# Patient Record
Sex: FEMALE | Race: BLACK OR AFRICAN AMERICAN | NOT HISPANIC OR LATINO
[De-identification: names, ages, dates, MRNs, and addresses within clinical notes are randomized per-mention and may not be internally consistent; named-entity substitution may affect disease eponyms.]

---

## 2024-08-27 ENCOUNTER — APPOINTMENT (OUTPATIENT)
Age: 79
End: 2024-08-27

## 2024-08-27 VITALS
OXYGEN SATURATION: 94 % | DIASTOLIC BLOOD PRESSURE: 80 MMHG | BODY MASS INDEX: 28.79 KG/M2 | SYSTOLIC BLOOD PRESSURE: 148 MMHG | WEIGHT: 190 LBS | HEIGHT: 68 IN | RESPIRATION RATE: 14 BRPM | TEMPERATURE: 97.4 F | HEART RATE: 77 BPM

## 2024-08-27 DIAGNOSIS — R06.02 SHORTNESS OF BREATH: ICD-10-CM

## 2024-08-27 DIAGNOSIS — Z87.891 PERSONAL HISTORY OF NICOTINE DEPENDENCE: ICD-10-CM

## 2024-08-27 DIAGNOSIS — J43.2 CENTRILOBULAR EMPHYSEMA: ICD-10-CM

## 2024-08-27 PROCEDURE — 94729 DIFFUSING CAPACITY: CPT

## 2024-08-27 PROCEDURE — 99203 OFFICE O/P NEW LOW 30 MIN: CPT | Mod: 25

## 2024-08-27 PROCEDURE — 94726 PLETHYSMOGRAPHY LUNG VOLUMES: CPT

## 2024-08-27 PROCEDURE — 94060 EVALUATION OF WHEEZING: CPT

## 2024-08-27 RX ORDER — ALBUTEROL SULFATE 90 UG/1
108 (90 BASE) INHALANT RESPIRATORY (INHALATION)
Qty: 1 | Refills: 0 | Status: ACTIVE | COMMUNITY
Start: 2024-08-27 | End: 1900-01-01

## 2024-08-28 PROBLEM — Z87.891 FORMER SMOKER: Status: ACTIVE | Noted: 2024-08-27

## 2024-08-28 PROBLEM — R06.02 SHORTNESS OF BREATH: Status: ACTIVE | Noted: 2024-08-28

## 2024-08-28 PROBLEM — J43.2 CENTRILOBULAR EMPHYSEMA: Status: ACTIVE | Noted: 2024-08-27

## 2024-08-28 NOTE — ASSESSMENT
[FreeTextEntry1] :  78 yo F. with PMH of HTN, CKD, DM who presents to establish care for concern over SOB/Breathlessness. She reports SOB x 2 months, worse at night, has to prop herself on up pillows or sleep next to a window for relief. CT Chest - July 2024 done at [facility] personally reviewed images and report, notable for centrilobular emphysema. TTE - August 2024 - EF 57%, normal PA pressure. On exam today, lungs are clear, no wheezing or rhonchi, in NAD. No LE edema or JVD noted. In office PFT completed - moderately severe obstructive disease and diffusion defect, minimal restriction. Care plans discussed - Emphysema likely contributing to her symptoms and PFT pattern, agreed to trial Trelegy 100mcg daily, Rx given for Albuterol to use as needed. Follow-up in 1 month.

## 2024-08-28 NOTE — ASSESSMENT
[FreeTextEntry1] :  78 yo F. with PMH of HTN, CKD, DM who presents to establish care for concern over SOB/Breathlessness. She reports SOB x 2 months, worse at night, has to prop herself on up pillows or sleep next to a window for relief. CT Chest - July 2024 done at [facility] personally reviewed images and report, notable for centrilobular emphysema. TTE - August 2024 - EF 57%, normal PA pressure. On exam today, lungs are clear, no wheezing or rhonchi, in NAD. No LE edema or JVD noted. In office PFT completed - moderately severe obstructive disease and diffusion defect, minimal restriction. Care plans discussed - Emphysema likely contributing to her symptoms and PFT pattern, agreed to trial Trelegy 100mcg daily, Rx given for Albuterol to use as needed. Follow-up in 1 month.

## 2024-09-04 ENCOUNTER — RX RENEWAL (OUTPATIENT)
Age: 79
End: 2024-09-04

## 2024-09-04 RX ORDER — FLUTICASONE FUROATE, UMECLIDINIUM BROMIDE AND VILANTEROL TRIFENATATE 100; 62.5; 25 UG/1; UG/1; UG/1
100-62.5-25 POWDER RESPIRATORY (INHALATION) DAILY
Qty: 60 | Refills: 0 | Status: ACTIVE | COMMUNITY
Start: 2024-08-27 | End: 1900-01-01

## 2024-09-17 ENCOUNTER — APPOINTMENT (OUTPATIENT)
Age: 79
End: 2024-09-17
Payer: MEDICARE

## 2024-09-17 VITALS
DIASTOLIC BLOOD PRESSURE: 80 MMHG | BODY MASS INDEX: 29.86 KG/M2 | HEIGHT: 68 IN | SYSTOLIC BLOOD PRESSURE: 138 MMHG | HEART RATE: 79 BPM | WEIGHT: 197 LBS | RESPIRATION RATE: 14 BRPM | OXYGEN SATURATION: 96 %

## 2024-09-17 DIAGNOSIS — R06.02 SHORTNESS OF BREATH: ICD-10-CM

## 2024-09-17 DIAGNOSIS — Z87.891 PERSONAL HISTORY OF NICOTINE DEPENDENCE: ICD-10-CM

## 2024-09-17 DIAGNOSIS — J43.2 CENTRILOBULAR EMPHYSEMA: ICD-10-CM

## 2024-09-17 PROCEDURE — 99213 OFFICE O/P EST LOW 20 MIN: CPT

## 2024-09-17 NOTE — ASSESSMENT
[FreeTextEntry1] :  78 yo F. with PMH of HTN, CKD, DM who presents for follow-up of SOB/Breathlessness.  CT Chest - July 2024 - centrilobular emphysema TTE - August 2024 - EF 57%, normal PA pressure Last visit was started on Trelegy, reports improvement in SOB with use.  Is able to sleep at night now without having to be by a window or sleep elevated.  On exam, lungs are clear, no wheezing or rhonchi, in NAD.  Care plans discussed - will continue for Trelegy (100mcg/62.5mcg/25 mcg) 1 puff Daily and Albuterol HFA to use as needed for SOB. Follow-up in 2 months.

## 2024-09-17 NOTE — ASSESSMENT
[FreeTextEntry1] :  80 yo F. with PMH of HTN, CKD, DM who presents for follow-up of SOB/Breathlessness.  CT Chest - July 2024 - centrilobular emphysema TTE - August 2024 - EF 57%, normal PA pressure Last visit was started on Trelegy, reports improvement in SOB with use.  Is able to sleep at night now without having to be by a window or sleep elevated.  On exam, lungs are clear, no wheezing or rhonchi, in NAD.  Care plans discussed - will continue for Trelegy (100mcg/62.5mcg/25 mcg) 1 puff Daily and Albuterol HFA to use as needed for SOB. Follow-up in 2 months.

## 2024-10-09 ENCOUNTER — RX RENEWAL (OUTPATIENT)
Age: 79
End: 2024-10-09

## 2024-12-19 ENCOUNTER — APPOINTMENT (OUTPATIENT)
Age: 79
End: 2024-12-19

## 2025-01-16 ENCOUNTER — APPOINTMENT (OUTPATIENT)
Age: 80
End: 2025-01-16
Payer: MEDICARE

## 2025-01-16 VITALS
OXYGEN SATURATION: 93 % | BODY MASS INDEX: 29.4 KG/M2 | SYSTOLIC BLOOD PRESSURE: 158 MMHG | WEIGHT: 194 LBS | HEART RATE: 76 BPM | RESPIRATION RATE: 14 BRPM | TEMPERATURE: 97.2 F | HEIGHT: 68 IN | DIASTOLIC BLOOD PRESSURE: 74 MMHG

## 2025-01-16 DIAGNOSIS — J43.2 CENTRILOBULAR EMPHYSEMA: ICD-10-CM

## 2025-01-16 PROCEDURE — G2211 COMPLEX E/M VISIT ADD ON: CPT

## 2025-01-16 PROCEDURE — 99213 OFFICE O/P EST LOW 20 MIN: CPT

## 2025-01-31 ENCOUNTER — RX RENEWAL (OUTPATIENT)
Age: 80
End: 2025-01-31